# Patient Record
Sex: FEMALE
[De-identification: names, ages, dates, MRNs, and addresses within clinical notes are randomized per-mention and may not be internally consistent; named-entity substitution may affect disease eponyms.]

---

## 2017-01-13 ENCOUNTER — APPOINTMENT (OUTPATIENT)
Dept: OPHTHALMOLOGY | Facility: CLINIC | Age: 22
End: 2017-01-13

## 2017-03-27 ENCOUNTER — APPOINTMENT (OUTPATIENT)
Dept: OPHTHALMOLOGY | Facility: CLINIC | Age: 22
End: 2017-03-27

## 2017-03-27 DIAGNOSIS — G93.2 BENIGN INTRACRANIAL HYPERTENSION: ICD-10-CM

## 2017-05-09 ENCOUNTER — APPOINTMENT (OUTPATIENT)
Dept: INFECTIOUS DISEASE | Facility: CLINIC | Age: 22
End: 2017-05-09

## 2017-05-09 DIAGNOSIS — Z71.89 OTHER SPECIFIED COUNSELING: ICD-10-CM

## 2017-08-30 ENCOUNTER — APPOINTMENT (OUTPATIENT)
Dept: OPHTHALMOLOGY | Facility: CLINIC | Age: 22
End: 2017-08-30

## 2017-08-30 ENCOUNTER — APPOINTMENT (OUTPATIENT)
Dept: OPHTHALMOLOGY | Facility: CLINIC | Age: 22
End: 2017-08-30
Payer: COMMERCIAL

## 2017-08-30 PROCEDURE — ZZZZZ: CPT

## 2017-09-06 ENCOUNTER — APPOINTMENT (OUTPATIENT)
Dept: OPHTHALMOLOGY | Facility: CLINIC | Age: 22
End: 2017-09-06
Payer: COMMERCIAL

## 2017-09-06 DIAGNOSIS — Z87.891 PERSONAL HISTORY OF NICOTINE DEPENDENCE: ICD-10-CM

## 2017-09-06 PROCEDURE — 92012 INTRM OPH EXAM EST PATIENT: CPT

## 2017-09-06 PROCEDURE — 92134 CPTRZ OPH DX IMG PST SGM RTA: CPT

## 2017-12-06 ENCOUNTER — APPOINTMENT (OUTPATIENT)
Dept: OPHTHALMOLOGY | Facility: CLINIC | Age: 22
End: 2017-12-06

## 2021-07-30 ENCOUNTER — OUTPATIENT (OUTPATIENT)
Dept: OUTPATIENT SERVICES | Facility: HOSPITAL | Age: 26
LOS: 1 days | Discharge: HOME | End: 2021-07-30

## 2021-07-30 DIAGNOSIS — C34.90 MALIGNANT NEOPLASM OF UNSPECIFIED PART OF UNSPECIFIED BRONCHUS OR LUNG: ICD-10-CM

## 2021-08-02 ENCOUNTER — RESULT REVIEW (OUTPATIENT)
Age: 26
End: 2021-08-02

## 2021-08-02 ENCOUNTER — APPOINTMENT (OUTPATIENT)
Dept: HEMATOLOGY ONCOLOGY | Facility: CLINIC | Age: 26
End: 2021-08-02
Payer: MEDICAID

## 2021-08-02 ENCOUNTER — LABORATORY RESULT (OUTPATIENT)
Age: 26
End: 2021-08-02

## 2021-08-02 VITALS
BODY MASS INDEX: 37.65 KG/M2 | RESPIRATION RATE: 17 BRPM | WEIGHT: 204.59 LBS | DIASTOLIC BLOOD PRESSURE: 71 MMHG | TEMPERATURE: 98.1 F | HEART RATE: 77 BPM | HEIGHT: 62 IN | OXYGEN SATURATION: 99 % | SYSTOLIC BLOOD PRESSURE: 103 MMHG

## 2021-08-02 DIAGNOSIS — I26.99 OTHER PULMONARY EMBOLISM W/OUT ACUTE COR PULMONALE: ICD-10-CM

## 2021-08-02 DIAGNOSIS — Z79.01 ENCOUNTER FOR THERAPEUTIC DRUG LVL MONITORING: ICD-10-CM

## 2021-08-02 DIAGNOSIS — Z51.81 ENCOUNTER FOR THERAPEUTIC DRUG LVL MONITORING: ICD-10-CM

## 2021-08-02 PROCEDURE — 99204 OFFICE O/P NEW MOD 45 MIN: CPT

## 2021-08-02 RX ORDER — ENOXAPARIN SODIUM 80 MG/.8ML
80 INJECTION SUBCUTANEOUS
Refills: 0 | Status: DISCONTINUED | COMMUNITY
End: 2021-08-02

## 2021-08-02 RX ORDER — CIPROFLOXACIN HYDROCHLORIDE 500 MG/1
500 TABLET, FILM COATED ORAL TWICE DAILY
Qty: 6 | Refills: 0 | Status: DISCONTINUED | COMMUNITY
Start: 2017-05-09 | End: 2021-08-02

## 2021-08-02 NOTE — PHYSICAL EXAM
[Fully active, able to carry on all pre-disease performance without restriction] : Status 0 - Fully active, able to carry on all pre-disease performance without restriction [Normal] : affect appropriate [de-identified] : elevated BMI [de-identified] : No edema

## 2021-08-02 NOTE — ASSESSMENT
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 She has been on Rivaroxaban since July 5, 2021 when she was found to have her 2nd PE.\par \par Plan:\par - Check Prothrombin gene mutation, Factor V Leiden\par - Although on Rivaroxaban, check PT/PTT, mixing studies, lupus anticoagulant, anti-cardiolipin Ab, B2G\par - APLS screen will need to be repeated if any positives in 12 weeks\par - Check ATIII activity\par - Given that she has had 2 VTEs in the last 10 years, one of which was very likely unprovoked, lifelong anticoagulation is recommended at this time\par - Pending hypercoag workup she should continue Rivaroxaban 20 mg daily, Rx sent for 3 month supply\par - Return visit in 3 months\par - Explore financial assistance for significant rivaroxaban copay\par \par \par I personally have spent a total of 50 minutes of time on the date of this encounter reviewing test results, documenting findings, coordinating care and directly consulting with the patient and/or designated family member.

## 2021-08-02 NOTE — HISTORY OF PRESENT ILLNESS
[de-identified] : 25F former smoker (10 py Hx, quit ) with history of VTE here for hypercoagulable workup and anticoagulation management. In  she had a pulmonary embolism that presented as severe back pain. At the time she was on OCP and smoking. She was on rivaroxaban for 6 months then switched to ASA 81 mg daily up until  when she became pregnant. Once she was found to be pregnant she was put on therapeutic lovenox injections up until delivery (she continued for 1 week following delivery). \par \par Up until 2021 she had not had any VTE reoccurrences and was no longer on AC. On 2021 she experienced back pain similar to her initial PE presentation and was found to have a pulmonary embolism on CT angio from [facility]. She was placed on rivaroxaban in the ED and discharged home with hematology follow-up. On todays visit she is taking Rivaroxaban 20 mg daily. She had one ectopic pregnancy in 2019. No miscarriages.\par \par Today she feels well, however since the second occurrence of the PE she has felt more fatigue, unclear if PE related or due to rivaroxaban. She denies chest pain or shortness of breath with normal exertion, fevers, chills, night sweats, unexpected weight loss.\par \par Social Hx:\par Lives in [place]\par Has 1 daughter\par She is a \par \par Family Hx:\par Mother and Father - alive and well\par 3 Brothers  - alive and well\par Maternal Grandfather  from lung cancer\par No familial Hx of blood clots\par \par Allergies: NKDA\par \par She was fully vaccinated against COVID-19 with pfizer vaccine in 2021. [de-identified] : A comprehensive review of systems was performed including constitutional, eyes, ENT, cardiovascular, respiratory, gastrointestinal, genitourinary, musculoskeletal, integumentary, neurological, psychiatric and hematologic / lymphatic. All pertinent positives are included in the H&P under interval history above and the remaining review of systems listed are negative.

## 2021-08-10 LAB
ALBUMIN SERPL ELPH-MCNC: 4.6 G/DL
ALP BLD-CCNC: 49 U/L
ALT SERPL-CCNC: 20 U/L
ANION GAP SERPL CALC-SCNC: 12 MMOL/L
APTT 2H P 1:4 NP PPP: 38.4 SEC
APTT 2H P INC PPP: 43.6 SEC
APTT 50/50 MIX COMMENT: NORMAL
APTT BLD: 39.4 SEC
APTT IMM NP/PRE NP PPP: 36.7 SEC
APTT INV RATIO PPP: 39.4 SEC
AST SERPL-CCNC: 15 U/L
AT III PPP CHRO-ACNC: 119 %
B2 GLYCOPROT1 AB SER QL: NEGATIVE
BILIRUB SERPL-MCNC: 0.2 MG/DL
BUN SERPL-MCNC: 12 MG/DL
CALCIUM SERPL-MCNC: 9.5 MG/DL
CARDIOLIPIN AB SER IA-ACNC: NEGATIVE
CHLORIDE SERPL-SCNC: 104 MMOL/L
CO2 SERPL-SCNC: 22 MMOL/L
CREAT SERPL-MCNC: 0.69 MG/DL
DNA PLOIDY SPEC FC-IMP: NORMAL
GLUCOSE SERPL-MCNC: 83 MG/DL
INR PPP: 1.43 RATIO
NPP NORMAL POOLED PLASMA: 33.3 SECS
POTASSIUM SERPL-SCNC: 4.4 MMOL/L
PROT SERPL-MCNC: 7.2 G/DL
PT BLD: 16.6 SEC
PTR INTERP: NORMAL
SODIUM SERPL-SCNC: 139 MMOL/L

## 2021-11-09 ENCOUNTER — OUTPATIENT (OUTPATIENT)
Dept: OUTPATIENT SERVICES | Facility: HOSPITAL | Age: 26
LOS: 1 days | Discharge: HOME | End: 2021-11-09

## 2021-11-09 DIAGNOSIS — I26.99 OTHER PULMONARY EMBOLISM WITHOUT ACUTE COR PULMONALE: ICD-10-CM

## 2021-11-10 ENCOUNTER — APPOINTMENT (OUTPATIENT)
Dept: HEMATOLOGY ONCOLOGY | Facility: CLINIC | Age: 26
End: 2021-11-10

## 2022-02-11 ENCOUNTER — RX RENEWAL (OUTPATIENT)
Age: 27
End: 2022-02-11

## 2022-02-15 ENCOUNTER — RX RENEWAL (OUTPATIENT)
Age: 27
End: 2022-02-15

## 2022-02-15 RX ORDER — RIVAROXABAN 20 MG/1
20 TABLET, FILM COATED ORAL
Qty: 30 | Refills: 0 | Status: ACTIVE | COMMUNITY
Start: 2021-08-02 | End: 1900-01-01

## 2025-07-25 ENCOUNTER — NON-APPOINTMENT (OUTPATIENT)
Age: 30
End: 2025-07-25